# Patient Record
Sex: FEMALE | Race: WHITE | NOT HISPANIC OR LATINO | Employment: OTHER | URBAN - METROPOLITAN AREA
[De-identification: names, ages, dates, MRNs, and addresses within clinical notes are randomized per-mention and may not be internally consistent; named-entity substitution may affect disease eponyms.]

---

## 2021-07-16 ENCOUNTER — HOSPITAL ENCOUNTER (EMERGENCY)
Dept: ULTRASOUND IMAGING | Facility: HOSPITAL | Age: 86
End: 2021-07-16
Payer: COMMERCIAL

## 2021-07-16 ENCOUNTER — HOSPITAL ENCOUNTER (EMERGENCY)
Facility: HOSPITAL | Age: 86
Discharge: HOME | End: 2021-07-16
Attending: EMERGENCY MEDICINE | Admitting: EMERGENCY MEDICINE
Payer: COMMERCIAL

## 2021-07-16 ENCOUNTER — HOSPITAL ENCOUNTER (EMERGENCY)
Dept: RADIOLOGY | Facility: HOSPITAL | Age: 86
End: 2021-07-16
Payer: COMMERCIAL

## 2021-07-16 VITALS
HEIGHT: 61 IN | RESPIRATION RATE: 18 BRPM | DIASTOLIC BLOOD PRESSURE: 89 MMHG | TEMPERATURE: 98.2 F | SYSTOLIC BLOOD PRESSURE: 196 MMHG | HEART RATE: 83 BPM | OXYGEN SATURATION: 96 % | WEIGHT: 127 LBS | BODY MASS INDEX: 23.98 KG/M2

## 2021-07-16 DIAGNOSIS — L03.90 CELLULITIS: ICD-10-CM

## 2021-07-16 LAB
ANION GAP SERPL CALCULATED.3IONS-SCNC: 7 MMOL/L (ref 5–18)
BASOPHILS # BLD AUTO: 0 10E3/UL (ref 0–0.2)
BASOPHILS NFR BLD AUTO: 1 %
BUN SERPL-MCNC: 22 MG/DL (ref 8–28)
C REACTIVE PROTEIN LHE: 0.3 MG/DL (ref 0–0.8)
CALCIUM SERPL-MCNC: 9.8 MG/DL (ref 8.5–10.5)
CHLORIDE BLD-SCNC: 108 MMOL/L (ref 98–107)
CO2 SERPL-SCNC: 27 MMOL/L (ref 22–31)
CREAT SERPL-MCNC: 0.9 MG/DL (ref 0.6–1.1)
EOSINOPHIL # BLD AUTO: 0 10E3/UL (ref 0–0.7)
EOSINOPHIL NFR BLD AUTO: 1 %
ERYTHROCYTE [DISTWIDTH] IN BLOOD BY AUTOMATED COUNT: 14.6 % (ref 10–15)
ERYTHROCYTE [SEDIMENTATION RATE] IN BLOOD BY WESTERGREN METHOD: 36 MM/HR (ref 0–20)
GFR SERPL CREATININE-BSD FRML MDRD: 56 ML/MIN/1.73M2
GLUCOSE BLD-MCNC: 92 MG/DL (ref 70–125)
HCT VFR BLD AUTO: 36.5 % (ref 35–47)
HGB BLD-MCNC: 12 G/DL (ref 11.7–15.7)
IMM GRANULOCYTES # BLD: 0.1 10E3/UL
IMM GRANULOCYTES NFR BLD: 1 %
LYMPHOCYTES # BLD AUTO: 1.2 10E3/UL (ref 0.8–5.3)
LYMPHOCYTES NFR BLD AUTO: 19 %
MCH RBC QN AUTO: 36.6 PG (ref 26.5–33)
MCHC RBC AUTO-ENTMCNC: 32.9 G/DL (ref 31.5–36.5)
MCV RBC AUTO: 111 FL (ref 78–100)
MONOCYTES # BLD AUTO: 0.4 10E3/UL (ref 0–1.3)
MONOCYTES NFR BLD AUTO: 6 %
NEUTROPHILS # BLD AUTO: 4.6 10E3/UL (ref 1.6–8.3)
NEUTROPHILS NFR BLD AUTO: 72 %
NRBC # BLD AUTO: 0 10E3/UL
NRBC BLD AUTO-RTO: 0 /100
PLATELET # BLD AUTO: 297 10E3/UL (ref 150–450)
POTASSIUM BLD-SCNC: 4.5 MMOL/L (ref 3.5–5)
RBC # BLD AUTO: 3.28 10E6/UL (ref 3.8–5.2)
SODIUM SERPL-SCNC: 142 MMOL/L (ref 136–145)
WBC # BLD AUTO: 6.3 10E3/UL (ref 4–11)

## 2021-07-16 PROCEDURE — 86141 C-REACTIVE PROTEIN HS: CPT | Performed by: MASSAGE THERAPIST

## 2021-07-16 PROCEDURE — 80048 BASIC METABOLIC PNL TOTAL CA: CPT | Performed by: MASSAGE THERAPIST

## 2021-07-16 PROCEDURE — 99284 EMERGENCY DEPT VISIT MOD MDM: CPT | Mod: 25

## 2021-07-16 PROCEDURE — 85652 RBC SED RATE AUTOMATED: CPT | Performed by: MASSAGE THERAPIST

## 2021-07-16 PROCEDURE — 36415 COLL VENOUS BLD VENIPUNCTURE: CPT | Performed by: MASSAGE THERAPIST

## 2021-07-16 PROCEDURE — 93970 EXTREMITY STUDY: CPT

## 2021-07-16 PROCEDURE — 73590 X-RAY EXAM OF LOWER LEG: CPT | Mod: RT

## 2021-07-16 PROCEDURE — 36592 COLLECT BLOOD FROM PICC: CPT | Performed by: MASSAGE THERAPIST

## 2021-07-16 PROCEDURE — 85025 COMPLETE CBC W/AUTO DIFF WBC: CPT | Performed by: MASSAGE THERAPIST

## 2021-07-16 RX ORDER — CEPHALEXIN 500 MG/1
500 CAPSULE ORAL 2 TIMES DAILY
Qty: 14 CAPSULE | Refills: 0 | Status: SHIPPED | OUTPATIENT
Start: 2021-07-16 | End: 2021-07-23

## 2021-07-16 ASSESSMENT — MIFFLIN-ST. JEOR: SCORE: 923.45

## 2021-07-16 NOTE — ED TRIAGE NOTES
Patient to triage with walker. Patient was seen in hospital in [place] and diagnosis ed with cellulitis and completed med. Here for redness and pain, no trauma

## 2021-07-16 NOTE — ED PROVIDER NOTES
Emergency Department Encounter     Evaluation Date & Time:   7/16/2021 10:16 AM    CHIEF COMPLAINT:  Leg Problem      Triage Note:Patient to triage with walker. Patient was seen in hospital in [place] and diagnosis ed with cellulitis and completed med. Here for redness and pain, no trauma    Impression and Plan     ED COURSE & MEDICAL DECISION MAKING:    Preston Good is a 89 year old female with bilateral R>L lower leg erythema and swelling.  Concern for cellulitis, with her history of nec fasc will get an XR to evaluate for gas in the tissues.  She has been afebrile and no leukocytosis here.      1300: CRP is WNL but ESR is elevated to 36. Other laboratory workup unremarkable. XR without evidence of gas in the soft tissues is reassuring that this is a typical cellulitis.     Differential considered includes but not limited to:  DVT: With asymmetry of leg swelling some concern for DVT, although she is on elequis, US without evidence of DVT.  Compartment syndrome: Distal pulses are intact, full ROM make compartment syndrome unlikely.       At the conclusion of the encounter I discussed the results of all the tests and the disposition. The questions were answered. The patient or family acknowledged understanding and was agreeable with the care plan.    FINAL IMPRESSION:    ICD-10-CM    1. Cellulitis  L03.90          Reassessments & Consults       MEDICATIONS GIVEN IN THE EMERGENCY DEPARTMENT:  Medications - No data to display    NEW PRESCRIPTIONS STARTED AT TODAY'S ED VISIT:  Discharge Medication List as of 7/16/2021  1:05 PM      START taking these medications    Details   cephALEXin (KEFLEX) 500 MG capsule Take 1 capsule (500 mg) by mouth 2 times daily for 7 days, Disp-14 capsule, R-0, E-Prescribe             HPI     HPI   Preston Good is a 89 year old female with remote history of necrotizing fasciitis in her right leg s/p surgical resection, Afib (on elequis) and hypothyroid, here with 3 days  "of redness and swelling of her right leg, which has worsened to the point that she is having trouble standing on her leg today. She does also note some redness in her shin, but says this is much less than the right leg.     Denies fevers, chills, chest pain, shortness of breath, abdominal pain, nausea or vomiting.   She did recently have of cellulitis in left leg (March 2021) which was treated in [place] (before she moved to [place]).     REVIEW OF SYSTEMS:  Review of Systems  Noncontributory except as noted in HPI       Medical History     No past medical history on file.    No past surgical history on file.    No family history on file.    Social History     Tobacco Use     Smoking status: Not on file   Substance Use Topics     Alcohol use: Not on file     Drug use: Not on file       cephALEXin (KEFLEX) 500 MG capsule        Physical Exam     First Vitals:  Patient Vitals for the past 24 hrs:   BP Temp Pulse Resp SpO2 Height Weight   07/16/21 1232 (!) 196/89 -- 83 -- 96 % -- --   07/16/21 1130 137/63 -- 74 -- 100 % -- --   07/16/21 1115 (!) 170/74 -- 76 -- 97 % -- --   07/16/21 1100 (!) 154/66 -- 75 -- 98 % -- --   07/16/21 1053 -- -- 75 -- 98 % -- --   07/16/21 1045 (!) 152/67 -- 76 -- 98 % -- --   07/16/21 1030 (!) 160/73 -- 90 -- 97 % -- --   07/16/21 0948 (!) 184/72 98.2  F (36.8  C) 76 18 97 % 1.549 m (5' 1\") 57.6 kg (127 lb)       PHYSICAL EXAM:   Physical Exam  Exam:  Constitutional: alert and no distress  Head: Normocephalic.   Respiratory: Clear to asculation  Gastrointestinal: Abdomen soft, non-tender.  Musculoskeletal: right leg with extensive surgical scars, normal muscle tone  Skin: Right calf and shin erythematous and mildly swollen, some erythema to left shin also  Neurologic: Sensation grossly WNL. Moves all 4 extremities   Psychiatric: mentation appears normal      Results     LAB:  All pertinent labs reviewed and interpreted  Labs Ordered and Resulted from Time of ED Arrival Up to the Time of " Departure from the ED   BASIC METABOLIC PANEL - Abnormal; Notable for the following components:       Result Value    Chloride 108 (*)     GFR Estimate 56 (*)     All other components within normal limits   CBC WITH PLATELETS AND DIFFERENTIAL - Abnormal; Notable for the following components:    RBC Count 3.28 (*)      (*)     MCH 36.6 (*)     Absolute Immature Granulocytes 0.1 (*)     All other components within normal limits   ERYTHROCYTE SEDIMENTATION RATE AUTO - Abnormal; Notable for the following components:    Erythrocyte Sedimentation Rate 36 (*)     All other components within normal limits   CRP INFLAMMATION - Normal   CBC WITH PLATELETS & DIFFERENTIAL    Narrative:     The following orders were created for panel order CBC with platelets differential.  Procedure                               Abnormality         Status                     ---------                               -----------         ------                     CBC with platelets and d...[[id number]]  Abnormal            Final result                 Please view results for these tests on the individual orders.       RADIOLOGY:  US Lower Extremity Venous Duplex Bilateral   Final Result   IMPRESSION:   1.  No deep venous thrombosis in the bilateral lower extremities.      XR Tibia & Fibula Right 2 Views   Final Result   IMPRESSION:   1.  No soft tissue gas. Surgical clips in the mid calf and about the ankle. Mild soft tissue swelling in the distal medial calf and ankle.   2.  No fracture or joint malalignment.   3.  Advanced right knee degenerative arthrosis.   4.  Degenerative changes in the mid and hindfoot.   5.  Atherosclerotic calcification.                     Salvatore Armas MD  Emergency Medicine  [facility] EMERGENCY DEPARTMENT       Salvatore Arams MD  Resident  07/16/21 [id number]

## 2021-07-16 NOTE — ED PROVIDER NOTES
"At this point I agree with the current assessment done in the Emergency Department by resident Dr. Armas. I, Sushila Almonte DO, have reviewed the documentation, personally taken the patient's history, performed an exam and agree with the physical finds, diagnosis and management plan.     Preston Good is a 89 year oldfemale, with no pertinent history who presents to the Emergency Department for the evaluation of leg pain.      The creation of this record is based on the scribe s observations of the work being performed by Sushila Almonte DO and the provider s statements to them. It was created on his behalf by Radha Lugo, a trained medical scribe. This document has been checked and approved by the attending provider.      ROS: Denies fever and chills.  Denies any numbness, tingling, or weakness in the right leg.    Social: Nonsmoker    Physical Exam:   BP (!) 184/72   Pulse 75   Temp 98.2  F (36.8  C)   Resp 18   Ht 1.549 m (5' 1\")   Wt 57.6 kg (127 lb)   SpO2 98%   BMI 24.00 kg/m    General presentation: Alert, Vital signs reviewed. No acute distress. Not ill appearing. Not ill appearing.   Circulatory:  Peripheral pulses are strong and equal in bilateral lower extremities.   Neurologic: Alert, oriented to person, place, and time. No motor or sensory deficits noted in the right leg. Cranial nerves II through XII are intact.  Musculoskeletal: Minimal warmth, erythema, tenderness to palpation, and edema located in the posterior lateral right lower leg.  No crepitus or fluctuant areas are noted.  Full range of motion in all extremities.     Results for orders placed or performed during the hospital encounter of 07/16/21   US Lower Extremity Venous Duplex Bilateral    Impression    IMPRESSION:  1.  No deep venous thrombosis in the bilateral lower extremities.   XR Tibia & Fibula Right 2 Views    Impression    IMPRESSION:  1.  No soft tissue gas. Surgical clips in the mid calf and about the ankle. " Mild soft tissue swelling in the distal medial calf and ankle.  2.  No fracture or joint malalignment.  3.  Advanced right knee degenerative arthrosis.  4.  Degenerative changes in the mid and hindfoot.  5.  Atherosclerotic calcification.   Basic metabolic panel   Result Value Ref Range    Sodium 142 136 - 145 mmol/L    Potassium 4.5 3.5 - 5.0 mmol/L    Chloride 108 (H) 98 - 107 mmol/L    Carbon Dioxide (CO2) 27 22 - 31 mmol/L    Anion Gap 7 5 - 18 mmol/L    Urea Nitrogen 22 8 - 28 mg/dL    Creatinine 0.90 0.60 - 1.10 mg/dL    Calcium 9.8 8.5 - 10.5 mg/dL    Glucose 92 70 - 125 mg/dL    GFR Estimate 56 (L) >60 mL/min/1.73m2   CBC with platelets and differential   Result Value Ref Range    WBC Count 6.3 4.0 - 11.0 10e3/uL    RBC Count 3.28 (L) 3.80 - 5.20 10e6/uL    Hemoglobin 12.0 11.7 - 15.7 g/dL    Hematocrit 36.5 35.0 - 47.0 %     (H) 78 - 100 fL    MCH 36.6 (H) 26.5 - 33.0 pg    MCHC 32.9 31.5 - 36.5 g/dL    RDW 14.6 10.0 - 15.0 %    Platelet Count 297 150 - 450 10e3/uL    % Neutrophils 72 %    % Lymphocytes 19 %    % Monocytes 6 %    % Eosinophils 1 %    % Basophils 1 %    % Immature Granulocytes 1 %    NRBCs per 100 WBC 0 <1 /100    Absolute Neutrophils 4.6 1.6 - 8.3 10e3/uL    Absolute Lymphocytes 1.2 0.8 - 5.3 10e3/uL    Absolute Monocytes 0.4 0.0 - 1.3 10e3/uL    Absolute Eosinophils 0.0 0.0 - 0.7 10e3/uL    Absolute Basophils 0.0 0.0 - 0.2 10e3/uL    Absolute Immature Granulocytes 0.1 (H) <=0.0 10e3/uL    Absolute NRBCs 0.0 10e3/uL   Erythrocyte sedimentation rate auto   Result Value Ref Range    Erythrocyte Sedimentation Rate 36 (H) 0 - 20 mm/hr   CRP inflammation   Result Value Ref Range    CRP 0.3 0.0-<0.8 mg/dL     US Lower Extremity Venous Duplex Bilateral   Final Result   IMPRESSION:   1.  No deep venous thrombosis in the bilateral lower extremities.      XR Tibia & Fibula Right 2 Views   Final Result   IMPRESSION:   1.  No soft tissue gas. Surgical clips in the mid calf and about the ankle.  Mild soft tissue swelling in the distal medial calf and ankle.   2.  No fracture or joint malalignment.   3.  Advanced right knee degenerative arthrosis.   4.  Degenerative changes in the mid and hindfoot.   5.  Atherosclerotic calcification.            MDM: 89-year-old female with a history of necrotizing fasciitis in 2003 presented to the ED for evaluation of right lower leg pain and swelling.  She denied any fevers or chills or any numbness, tingling, weakness in the right leg.  Of note, the patient was treated for left leg cellulitis 4 months ago.  Upon arrival to the ED the patient was noted to be hypertensive but she was afebrile and otherwise hemodynamically stable.  She did not appear to be in any obvious distress or discomfort at the time of my evaluation.  On exam the patient was noted to have very mild erythema, warmth, tenderness palpation, and edema located over the posterior lateral aspect of the right lower leg.  There was no crepitus or fluctuant areas noted.  No neurovascular deficits were noted in the right leg.    CBC, BMP, ESR, and CRP were all reassuring.  Ultrasound of the right leg was negative for DVT.  X-ray of the right lower extremity did not show evidence of soft tissue gas or any other acute abnormalities.    The patient was reevaluated informed of the reassuring lab and imaging results.  She was informed that her symptoms are likely due to cellulitis.  The patient was sent home with Keflex to treat the cellulitis.  She was instructed to follow-up with her primary care physician for reevaluation or to return back to ED sooner for any worsening redness, swelling, pain, fevers, or any other new or concerning symptoms.        I, Sushila Almonte DO, personally performed the services described in this documentation, as scribed by Radha Lugo in my presence, and it is both accurate and complete.       Sushila Almonte DO  07/16/21 [id number]

## 2022-01-31 ENCOUNTER — DOCUMENTATION ONLY (OUTPATIENT)
Dept: SURGERY | Facility: CLINIC | Age: 87
End: 2022-01-31
Payer: MEDICARE